# Patient Record
(demographics unavailable — no encounter records)

---

## 2024-11-28 NOTE — HISTORY OF PRESENT ILLNESS
[FreeTextEntry1] : HPI: 33yo female presents for postpartum visit. S/p  10/17/24 with 1st deg laceration. States she is well supported at home.  Breastfeeding without complication Gyn: laceration well healed and cervix closed Pt declines contraception at this time Denies si/sx of PPD Precautions reviewed BROOKE Salas MD